# Patient Record
Sex: MALE | Race: WHITE | NOT HISPANIC OR LATINO | URBAN - METROPOLITAN AREA
[De-identification: names, ages, dates, MRNs, and addresses within clinical notes are randomized per-mention and may not be internally consistent; named-entity substitution may affect disease eponyms.]

---

## 2020-10-25 ENCOUNTER — EMERGENCY (EMERGENCY)
Facility: HOSPITAL | Age: 20
LOS: 1 days | Discharge: ANOTHER HOSPITAL | End: 2020-10-25
Attending: EMERGENCY MEDICINE | Admitting: EMERGENCY MEDICINE
Payer: COMMERCIAL

## 2020-10-25 VITALS
SYSTOLIC BLOOD PRESSURE: 139 MMHG | OXYGEN SATURATION: 100 % | WEIGHT: 160.06 LBS | HEART RATE: 59 BPM | TEMPERATURE: 97 F | RESPIRATION RATE: 16 BRPM | HEIGHT: 70 IN | DIASTOLIC BLOOD PRESSURE: 81 MMHG

## 2020-10-25 DIAGNOSIS — F43.24 ADJUSTMENT DISORDER WITH DISTURBANCE OF CONDUCT: ICD-10-CM

## 2020-10-25 DIAGNOSIS — F12.10 CANNABIS ABUSE, UNCOMPLICATED: ICD-10-CM

## 2020-10-25 LAB
ANION GAP SERPL CALC-SCNC: 3 MMOL/L — LOW (ref 5–17)
APAP SERPL-MCNC: <2 UG/ML — LOW (ref 10–30)
APPEARANCE UR: CLEAR — SIGNIFICANT CHANGE UP
BASOPHILS # BLD AUTO: 0.08 K/UL — SIGNIFICANT CHANGE UP (ref 0–0.2)
BASOPHILS NFR BLD AUTO: 0.8 % — SIGNIFICANT CHANGE UP (ref 0–2)
BILIRUB UR-MCNC: NEGATIVE — SIGNIFICANT CHANGE UP
BUN SERPL-MCNC: 14 MG/DL — SIGNIFICANT CHANGE UP (ref 7–23)
CALCIUM SERPL-MCNC: 9 MG/DL — SIGNIFICANT CHANGE UP (ref 8.5–10.1)
CHLORIDE SERPL-SCNC: 108 MMOL/L — SIGNIFICANT CHANGE UP (ref 96–108)
CO2 SERPL-SCNC: 28 MMOL/L — SIGNIFICANT CHANGE UP (ref 22–31)
COLOR SPEC: SIGNIFICANT CHANGE UP
CREAT SERPL-MCNC: 0.77 MG/DL — SIGNIFICANT CHANGE UP (ref 0.5–1.3)
DIFF PNL FLD: NEGATIVE — SIGNIFICANT CHANGE UP
EOSINOPHIL # BLD AUTO: 0.03 K/UL — SIGNIFICANT CHANGE UP (ref 0–0.5)
EOSINOPHIL NFR BLD AUTO: 0.3 % — SIGNIFICANT CHANGE UP (ref 0–6)
ETHANOL SERPL-MCNC: <10 MG/DL — SIGNIFICANT CHANGE UP (ref 0–10)
GLUCOSE SERPL-MCNC: 86 MG/DL — SIGNIFICANT CHANGE UP (ref 70–99)
GLUCOSE UR QL: NEGATIVE — SIGNIFICANT CHANGE UP
HCT VFR BLD CALC: 37.6 % — LOW (ref 39–50)
HGB BLD-MCNC: 12.1 G/DL — LOW (ref 13–17)
IMM GRANULOCYTES NFR BLD AUTO: 0.6 % — SIGNIFICANT CHANGE UP (ref 0–1.5)
KETONES UR-MCNC: NEGATIVE — SIGNIFICANT CHANGE UP
LEUKOCYTE ESTERASE UR-ACNC: NEGATIVE — SIGNIFICANT CHANGE UP
LYMPHOCYTES # BLD AUTO: 1.52 K/UL — SIGNIFICANT CHANGE UP (ref 1–3.3)
LYMPHOCYTES # BLD AUTO: 15.8 % — SIGNIFICANT CHANGE UP (ref 13–44)
MCHC RBC-ENTMCNC: 18.9 PG — LOW (ref 27–34)
MCHC RBC-ENTMCNC: 32.2 GM/DL — SIGNIFICANT CHANGE UP (ref 32–36)
MCV RBC AUTO: 58.8 FL — LOW (ref 80–100)
MONOCYTES # BLD AUTO: 0.57 K/UL — SIGNIFICANT CHANGE UP (ref 0–0.9)
MONOCYTES NFR BLD AUTO: 5.9 % — SIGNIFICANT CHANGE UP (ref 2–14)
NEUTROPHILS # BLD AUTO: 7.35 K/UL — SIGNIFICANT CHANGE UP (ref 1.8–7.4)
NEUTROPHILS NFR BLD AUTO: 76.6 % — SIGNIFICANT CHANGE UP (ref 43–77)
NITRITE UR-MCNC: NEGATIVE — SIGNIFICANT CHANGE UP
NRBC # BLD: 0 /100 WBCS — SIGNIFICANT CHANGE UP (ref 0–0)
PCP SPEC-MCNC: SIGNIFICANT CHANGE UP
PH UR: 6.5 — SIGNIFICANT CHANGE UP (ref 5–8)
PLATELET # BLD AUTO: 290 K/UL — SIGNIFICANT CHANGE UP (ref 150–400)
POTASSIUM SERPL-MCNC: 4.4 MMOL/L — SIGNIFICANT CHANGE UP (ref 3.5–5.3)
POTASSIUM SERPL-SCNC: 4.4 MMOL/L — SIGNIFICANT CHANGE UP (ref 3.5–5.3)
PROT UR-MCNC: NEGATIVE — SIGNIFICANT CHANGE UP
RBC # BLD: 6.4 M/UL — HIGH (ref 4.2–5.8)
RBC # FLD: 18.2 % — HIGH (ref 10.3–14.5)
SALICYLATES SERPL-MCNC: <1.7 MG/DL — LOW (ref 2.8–20)
SODIUM SERPL-SCNC: 139 MMOL/L — SIGNIFICANT CHANGE UP (ref 135–145)
SP GR SPEC: 1.01 — SIGNIFICANT CHANGE UP (ref 1.01–1.02)
UROBILINOGEN FLD QL: NEGATIVE — SIGNIFICANT CHANGE UP
WBC # BLD: 9.61 K/UL — SIGNIFICANT CHANGE UP (ref 3.8–10.5)
WBC # FLD AUTO: 9.61 K/UL — SIGNIFICANT CHANGE UP (ref 3.8–10.5)

## 2020-10-25 PROCEDURE — 90792 PSYCH DIAG EVAL W/MED SRVCS: CPT | Mod: GC,95

## 2020-10-25 PROCEDURE — 99283 EMERGENCY DEPT VISIT LOW MDM: CPT

## 2020-10-25 PROCEDURE — 93010 ELECTROCARDIOGRAM REPORT: CPT

## 2020-10-25 NOTE — ED PROVIDER NOTE - OBJECTIVE STATEMENT
18 yo male with h/o previous psychiatric admission brought to ED by [facility] (not under arrest) for psychiatric evaluation. As per patient, his mother wanted him to come for psychiatric evaluation, patient unsure exactly why. Patient explains a neighbor called 911 because patient "looked suspicious." Patient explains he he currently is living with his mother and step father, states they had a misunderstanding so he packed all his belonging a left the house. Denies by verbal or physical altercation. When patient asked why his mother wanted him to get an evaluation he stated because she thought he was having   "erratic behavior" Patient admits he was admitted last year to psych at [facility] and ?2 weeks and discharge on Abilify however states he only took the medication for 1 month then never followed up. Patient admits to occasionally smoking marijuana, no additional drug use. Admits to occasional alcohol, none today.  no suicidal or homicial ideation/attempt.

## 2020-10-25 NOTE — ED BEHAVIORAL HEALTH ASSESSMENT NOTE - DESCRIPTION
Guarded, but calm, superficially cooperative with assessment. Did not require PRNs or restraints.     T(C): 36.7 (10-25-20 @ 17:08), Max: 36.7 (10-25-20 @ 17:08)  HR: 50 (10-25-20 @ 17:08) (50 - 59)  BP: 138/82 (10-25-20 @ 17:08) (138/82 - 139/81)  RR: 16 (10-25-20 @ 17:08) (16 - 16)  SpO2: 100% (10-25-20 @ 17:08) (100% - 100%)  Wt(kg): --    COVID Exposure Screen-     *In the past 14 days, has the patient been around anyone with a positive COVID-19 test?*   (  ) Yes   ( x  ) No   (  ) Unknown- Reason (e.g. collateral uncertain, refusing to answer, etc.):  ______  *Has the patient been out of [place] within the past 14 days?*  (  ) Yes   ( x  ) No   (  ) Unknown- Reason (e.g. collateral uncertain, refusing to answer, etc.): _______ none Homeless x 2 years per step father's report, recently moved back in with parents. Completed high school and 1 year of college before dropping out. Employed as [email or web address]  in July (per pt's report), currently unemployed.

## 2020-10-25 NOTE — ED BEHAVIORAL HEALTH ASSESSMENT NOTE - OTHER PAST PSYCHIATRIC HISTORY (INCLUDE DETAILS REGARDING ONSET, COURSE OF ILLNESS, INPATIENT/OUTPATIENT TREATMENT)
1 hospitalization at [facility] about one year ago, unclear events preceding this admission. Discharged on abilify 5mg, however pt self discontinued this and did not follow up

## 2020-10-25 NOTE — ED BEHAVIORAL HEALTH ASSESSMENT NOTE - CASE SUMMARY
20 y/o single, unemployed male, living with mother/step father and 4 siblings who are minors (as of 3 weeks ago), previously undomiciled x 2 years per collateral, w/PPHx of 1 hospitalizations, ongoing cannabis abuse, otherwise no SA/NSSIB, no known legal concerns, and no ongoing medical issues or drug allergies, presenting today as somewhat guarded, and superficially cooperative with assessment.  pt denies symptoms and doesn't appear manic or psychotic, but pt's stepfather reports a recent hx of patient appearing psychotic at home, and stepfather referred to mother when asked about issues of dangerousness, so in order to complete the risk and clinical assessment pt needs to be held for collateral information from mother. plan relayed to ED provider.
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
## 2020-10-25 NOTE — ED PROVIDER NOTE - CLINICAL SUMMARY MEDICAL DECISION MAKING FREE TEXT BOX
18 yo male with psych hx presents to the ED for psychiatric evaluation. Will check labs, urine tox, alcohol level, plan for psych consult.

## 2020-10-25 NOTE — ED PROVIDER NOTE - ATTENDING CONTRIBUTION TO CARE
I have personally performed a face to face diagnostic evaluation on this patient.  I have reviewed the PA note and agree with the history, exam, and plan of care, except as noted.  History and Exam by me shows patient brought in by police (not arrested) for a psych eval, patient states he packed his things and was walking down the street and a neighbor had called the police because "he looked suspicious". patient states he needed to stay at a friends house because his mom was going away on vacation, patient calm, cooperative, heart and lungs clear, abdomen soft, denies suicidal or homicidal ideations, states he had a psych admission last year at [facility] and had taken abilify for a month and stopped taking it on his own, f/u labs, etoh, psych consult.

## 2020-10-25 NOTE — ED BEHAVIORAL HEALTH ASSESSMENT NOTE - HPI (INCLUDE ILLNESS QUALITY, SEVERITY, DURATION, TIMING, CONTEXT, MODIFYING FACTORS, ASSOCIATED SIGNS AND SYMPTOMS)
Ravi is a 20 y/o single male, living with his mother, step-father, and four half siblings who are minors (moved back home 3 weeks ago, previously undomiciled), currently unemployed, w/PPHx of one hospitalization approximately 1 year ago, unclear dx at this time, no SA/NSSIB, not currently on meds (did not f/u following hospitalization), ongoing cannabis abuse, no known legal hx, and no medical concerns or known drug allergies, BIB police (not under arrest) after an unknown third party called 911 reporting suspicious behavior.     Patient states he was walking to a friend’s house with his belongings, with the intention of staying there for 2-3 days while his family was away. Reports he packed “most of his things, probably 150/200 pounds” of belongings, and was stopped on his way over by the police who subsequently brought him to the ED. He is somewhat guarded but superficially cooperative with assessment. States he recently returned to his parents’ home after living in an apartment nearby for the last 2 years in order to “try it out,” and said his parents were uncomfortable with him being alone in the home while they were away, which is why he was bringing his belongings to a friend’s house. Pt unable/unwilling to specify what they were uncomfortable with. Otherwise, states he was playing video games earlier and got “too into it,” which he repeatedly describes as “too energetic,” elaborating on this to say he was cursing and yelling while playing a PC game, occasionally banging his desk. He reports smoking cannabis 2-3 times per week, and is an inconsistent historian when discussing last use (first said today then recanted and said yesterday). Drinks alcohol with friends, about 1-2 drinks monthly. Denies other substance use. Denies feeling depressed or anxious. Denies panic attacks. Denies current or prior symptoms of daljit, including decreased need for sleep, euphoric mood, or pressured speech. States he is sleeping 6-7 hours per night and feels well rested during the day. Denies current or prior psychotic symptoms, including VAH/paranoia/IOR. He denies current or prior SI/SA/NSSIB. Denies HI. Denies aggression towards property or others outside of being animated when playing a video game as previously described.     Regarding prior treatment, he states a similar episode occurred about one year ago, prompting ED visit and subsequent hospitalization, but believes he was more agitated at that time (however denies aggression). He was started on abilify 5mg, which he discontinued after discharge and did not follow up with aftercare.     Patient’s step-father provided collateral, summarized here: States patient’s mother is able to provide more pertinent information regarding today’s events, however disputes some details provided by patient.  pt was undomiciled for approximately 2 years, living on the streets in [place] and family agreed to take him back in about 3 weeks ago. Since then, he has been smoking cannabis frequently, sleeping the majority of the day, occasionally laughing/mumbling to himself. He is unable to further clarify events leading to today’s ED presentation, however  family was uncomfortable with patient staying alone in the house while they were on vacation, due to his substance use and concerns he may be using other drugs in addition to cannabis. See ED behavioral health note for more detail.

## 2020-10-25 NOTE — ED ADULT NURSE REASSESSMENT NOTE - NS ED NURSE REASSESS COMMENT FT1
Alcohol noted to be cancelled by lab. No reorder noted. Lab called by primary RN. As per [facility] the alcohol was reordered by lab and is being run. ED JAYDEN Montes aware.

## 2020-10-25 NOTE — ED BEHAVIORAL HEALTH NOTE - BEHAVIORAL HEALTH NOTE
==============  COLLATERAL  ==============    Writer received phone call from pt’s step-father-Hiren ([phone]) who deferred writer to speak with pt’s mother for pt’s full history. Step-father stated that pt’s mother reportedly went out grocery shopping an hour ago but he could be wrong as pt’s mother is currently upset with step-father and may be avoiding everyone all together. Step-father states that pt was living in an apartment with his maternal grandmother owned by the step-father for 2 years until September 2020. Pt’s maternal grandmother suffered a stroke and was transferred to a nursing home. Step-father states that the apartment was in need of major rehab due to pt’s failure to maintain the apartment, thus pt was informed that he needed to find another place to stay. Step-father states that pt was living on the streets in [place] and about 3 weeks ago, mother and step-father took pt in for shelter. Step-father states that pt was very unkempt when he first came home but pt later cleaned himself up. Step-father states that pt spent his days mostly sleeping and smoking THC. Stepfather who is an  took pt to work a couple of times in hopes that it would motivate him to want to make an income however pt did not appear interested. Step-father states that pt was minimally helpful. Step-father states that in the last few weeks, he has observed pt to be muttering to himself as well as laughing to himself for no apparent reason on 2 different occasions. Step-father has also found makeshift bongs made out of plastic water bottles in the backyard and this has been concerning as he has 4 younger children (9, 7, 5 & 4) in the house and doesn’t want pt to be setting bad examples. Step-father does not know where pt gets money from to smoke cannabis. Step-father was unable to comment on whether pt is using any other drugs. Yesterday, step-father and mother informed pt that he needed to find alternate living arrangements as step-father and mother were going away for 2 nights and pt could not stay in the house alone. Step-father states that pt took the news appropriately and went about his business.     Step-father stated that pt has been psychiatrically hospitalized in the past and was taking medication but then stopped taking it and pt has not been doing well since then. Step-father states that there is a strong family history of tragedies and mental illness/substance abuse but again deferred writer to speak with pt’s mother for details. Step-father unable to comment on whether pt has any history of violence or suicidality despite being  to pt’s mother for the last 15 years.      Plan: Telepsych team awaiting call from pt’s mother for full collateral history.
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
## 2020-10-25 NOTE — ED PROVIDER NOTE - PROGRESS NOTE DETAILS
Tried to call patient's mother Mildred twice, left message to call  back ED ([phone]). Also tried calling patient's step father Hiren ([phone]), left message to call back After multiple calls to patient's mother, finally spoke with her. She states he has been not acting himself with erratic behavior and is concerned with patient living at home. Denies any suicidal or homicidal ideation/attempt. Mother aware tele psychiatrist will be calling for collateral information. Pt seen by telepsy and after dw pts mother - the pt will be admitted - states pt is on board with plan. Dw telepsy re neg covid - will arrange placement Pt accepted to [facility] - will notify re accepting once received paperwork Pt accepted to [place] - Dr Estrada

## 2020-10-25 NOTE — ED ADULT NURSE REASSESSMENT NOTE - NS ED NURSE REASSESS COMMENT FT1
Patient refusing COVID swab at this time. ED JAYDEN Loza made aware. Patient wants to wait to see what comes from telepsych before agreeing to COVID swab.

## 2020-10-25 NOTE — ED BEHAVIORAL HEALTH NOTE - BEHAVIORAL HEALTH NOTE
===================  PRE-HOSPITAL COURSE  ===================  SOURCE:  [facility]   DETAILS:  Pt BIB [employer]     ============  ED COURSE  ============  SOURCE:  SANYA Hopper   ARRIVAL:  Pt BIB [employer]  BELONGINGS:  Clothing   BEHAVIOR: Per RN, Pt was BIB [employer] after neighbor called 911 reporting that there was a suspicious person (the patient) walking down the street with 3 duffle bags. Pt states that his mother told the police that pt has been increasingly “energetic” and wanted pt to be psychiatrically evaluated. Pt has been very calm in the ED. RN notes that pt has poor eye contact and gives very short answers, presents with flat affect. RN states that pt complied with blood draws and provided urine sample. Pt presents as appropriately groomed with good hygiene. No psychosis or daljit noted. Pt denies experiencing any AH/VH/SI/HI. No agitation or aggression noted. Pt has been resting comfortably on stretcher.    TREATMENT: None given   VISITORS: None at bedside       ========================  COLLATERAL  ========================    Writer attempted to call pt’s mother Mildred at [phone] and pt’s step-fatherLaura at [phone] to obtain collateral but no answer. Writer left voicemail messages on both phone numbers requesting to return phone call.

## 2020-10-25 NOTE — ED BEHAVIORAL HEALTH ASSESSMENT NOTE - SUMMARY
18 y/o single, unemployed male, living with mother/step father and 4 siblings who are minors (as of 3 weeks ago), previously undomiciled x 2 years per collateral, w/PPHx of 1 hospitalizations, ongoing cannabis abuse, otherwise no SA/NSSIB, no known legal concerns, and no ongoing medical issues or drug allergies, presenting today as somewhat guarded, and superficially cooperative with assessment. No clear signs of psychosis/daljit on mental status exam, and patient subjectively denies all mood/psychotic symptoms. Collateral provided from step father suggests potential psychotic symptoms exhibited at home, however further collateral is required to ascertain diagnosis, risk assessment, and disposition. Patient’s mother has been contacted and VM left requesting callback to provide collateral. Hold for reassessment at this time.

## 2020-10-26 VITALS
SYSTOLIC BLOOD PRESSURE: 119 MMHG | TEMPERATURE: 98 F | HEART RATE: 89 BPM | RESPIRATION RATE: 16 BRPM | OXYGEN SATURATION: 98 % | DIASTOLIC BLOOD PRESSURE: 75 MMHG

## 2020-10-26 DIAGNOSIS — F29 UNSPECIFIED PSYCHOSIS NOT DUE TO A SUBSTANCE OR KNOWN PHYSIOLOGICAL CONDITION: ICD-10-CM

## 2020-10-26 LAB — SARS-COV-2 RNA SPEC QL NAA+PROBE: SIGNIFICANT CHANGE UP

## 2020-10-26 PROCEDURE — 99214 OFFICE O/P EST MOD 30 MIN: CPT | Mod: 95

## 2020-10-26 PROCEDURE — 99285 EMERGENCY DEPT VISIT HI MDM: CPT

## 2020-10-26 PROCEDURE — 81003 URINALYSIS AUTO W/O SCOPE: CPT

## 2020-10-26 PROCEDURE — 87635 SARS-COV-2 COVID-19 AMP PRB: CPT

## 2020-10-26 PROCEDURE — 80307 DRUG TEST PRSMV CHEM ANLYZR: CPT

## 2020-10-26 PROCEDURE — 80048 BASIC METABOLIC PNL TOTAL CA: CPT

## 2020-10-26 PROCEDURE — 85025 COMPLETE CBC W/AUTO DIFF WBC: CPT

## 2020-10-26 PROCEDURE — 84443 ASSAY THYROID STIM HORMONE: CPT

## 2020-10-26 PROCEDURE — 36415 COLL VENOUS BLD VENIPUNCTURE: CPT

## 2020-10-26 PROCEDURE — 93005 ELECTROCARDIOGRAM TRACING: CPT

## 2020-10-26 PROCEDURE — 86769 SARS-COV-2 COVID-19 ANTIBODY: CPT

## 2020-10-26 NOTE — PROGRESS NOTE BEHAVIORAL HEALTH - NSBHCHARTREVIEWLAB_PSY_A_CORE FT
10-25    139  |  108  |  14  ----------------------------<  86  4.4   |  28  |  0.77    Ca    9.0      25 Oct 2020 14:48                          12.1   9.61  )-----------( 290      ( 25 Oct 2020 14:48 )             37.6

## 2020-10-26 NOTE — PROGRESS NOTE BEHAVIORAL HEALTH - OTHER
superficially cooperative, guarded guarded somewhat vague, impoverished suspected delusions based on collateral report. Ideation to burn down family home and buy a rifle

## 2020-10-26 NOTE — ED BEHAVIORAL HEALTH NOTE - BEHAVIORAL HEALTH NOTE
REASSESSMENT NOTE    Reassessed around 520AM: per nursing staff, patient is sleeping. no acute events.    Per HPI/initial note: 18 y/o single, unemployed male, living with mother/step father and 4 siblings who are minors (as of 3 weeks ago), previously undomiciled x 2 years per collateral, w/PPHx of 1 hospitalizations, ongoing cannabis abuse, otherwise no SA/NSSIB, no known legal concerns, and no ongoing medical issues or drug allergies, presenting today as somewhat guarded, and superficially cooperative with assessment. No clear signs of psychosis/daljit on mental status exam, and patient subjectively denies all mood/psychotic symptoms. Collateral provided from step father suggests potential psychotic symptoms exhibited at home, however further collateral is required to ascertain diagnosis, risk assessment, and disposition. Patient’s mother has been contacted and VM left requesting callback to provide collateral.     Primary Dx Cannabis abuse F12.10. Secondary Dx 1 Adjustment disorder with disturbance of conduct F43.24.    Hold for reassessment at this time.   Still awaiting collateral from mother Mildred Suarez [phone].

## 2020-10-26 NOTE — SBIRT NOTE ADULT - NSSBIRTBRIEFINTDET_GEN_A_CORE
SW met with pt and provided education on SBIRT. Pt denied needing any services for ETOH. Sw provided a brief intervention. Pt declined any treatment.

## 2020-10-26 NOTE — SBIRT NOTE ADULT - NSSBIRTDRGBRIEFINTDET_GEN_A_CORE
Sw provided pt with education about the DASH 10 Screen. Pt declined offer for treatment. Pt states he only uses THC and does not use it often, the answers above apply only when he is using.

## 2020-10-26 NOTE — ED ADULT NURSE REASSESSMENT NOTE - NS ED NURSE REASSESS COMMENT FT1
Patient spoke with telepsych who stated patient will be placed today at facility. Patient cooperating, no unsual behavior noted.

## 2020-10-26 NOTE — PROGRESS NOTE BEHAVIORAL HEALTH - SUMMARY
Patient reassessed by telepsych, chart reviewed and handoff received from previous provider. In brief, pt is a 20 y/o single, unemployed male, living with mother/step father and 4 siblings who are minors (as of 3 weeks ago), previously undomiciled x 2 years per collateral, w/PPHx of 1 hospitalizations, ongoing cannabis abuse, otherwise no SA/NSSIB, no known legal concerns, and no ongoing medical issues or drug allergies, presenting after pt's neighbors activated 911 due to pt appearing to be a "suspicious person" in the neighborhood. Patient is notably guarded, superficial, and denying all complaints on reassessment, however collateral from pt's mother is concerning for 1 month worsening paranoid ideation, random disappearances, threats of harm towards family members, and journal entry endorsing plan for arson and to purchase a rifle. At this time pt is unsafe for discharge and requires inpt hospitalization for safety and stabilization.

## 2020-10-26 NOTE — PROGRESS NOTE BEHAVIORAL HEALTH - NSBHCHARTREVIEWVS_PSY_A_CORE FT
Vital Signs Last 24 Hrs  T(C): 36.4 (26 Oct 2020 06:19), Max: 37 (25 Oct 2020 21:20)  T(F): 97.6 (26 Oct 2020 06:19), Max: 98.6 (25 Oct 2020 21:20)  HR: 66 (26 Oct 2020 06:19) (50 - 66)  BP: 118/74 (26 Oct 2020 06:19) (118/74 - 139/89)  BP(mean): --  RR: 16 (26 Oct 2020 06:19) (15 - 16)  SpO2: 98% (26 Oct 2020 06:19) (98% - 100%)
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
## 2020-10-27 LAB
SARS-COV-2 IGG SERPL QL IA: NEGATIVE — SIGNIFICANT CHANGE UP
SARS-COV-2 IGM SERPL IA-ACNC: 0.01 INDEX — SIGNIFICANT CHANGE UP

## 2021-06-16 NOTE — PROGRESS NOTE BEHAVIORAL HEALTH - NS ED BHA MED ROS CONSTITUTIONAL SYMPTOMS
Continue: PreserVision AREDS 2 (vit c,o-vo-ibuea-lutein-zeaxan): capsule: [id number] mg-unit-mg-mg 1 unit twice a day as directed by mouth No complaints